# Patient Record
(demographics unavailable — no encounter records)

---

## 2025-02-20 NOTE — CARDIOLOGY SUMMARY
[No Ischemia] : no Ischemia [No Exercise Ind Arr] : no exercise induced arrhythmias [No Symptoms] : no Symptoms [___] : [unfilled] [LVEF ___%] : LVEF [unfilled]% [de-identified] : 2/20/25, sinus rhythm, nonspecific ST-T wave changes. 8/5/24, Sinus Bradycardia  -Left atrial enlargement. Voltage criteria for LVH (R(I)+S(III) exceeds 2.50 mV) -Voltage criteria w/o ST/T abnormality may be normal.  11/29/23, Sinus Rhythm  Voltage criteria for LVH (R(I)+S(III) exceeds 2.50 mV) -Voltage criteria w/o ST/T abnormality may be normal. -Poor R-wave progression -nonspecific -consider old anterior infarct. -Nonspecific T-abnormality. 5/11/23, Sinus  Rhythm, -Left atrial enlargement.  Voltage criteria for LVH  (R(I)+S(III) exceeds 2.50 mV)  -Voltage criteria w/o ST/T abnormality may be normal.  -Poor R-wave progression -nonspecific -consider old anterior infarct.  3/7/23,Sinus  Rhythm , -Left atrial enlargement.  Voltage criteria for LVH  (R(I)+S(III) exceeds 2.50 mV)  -Voltage criteria w/o ST/T abnormality may be normal.  -  Nonspecific T-abnormality.  9/6/22, Sinus  Bradycardia , Voltage criteria for LVH  (R(I)+S(III) exceeds 2.50 mV)  -Voltage criteria w/o ST/T abnormality may be normal.  -Poor R-wave progression -nonspecific -consider old anterior infarct.  -  Nonspecific T-abnormality.  [de-identified] : 7/1/21, EF 63%, moderate TR, trace MR,PA.

## 2025-02-20 NOTE — HISTORY OF PRESENT ILLNESS
[FreeTextEntry1] : 76-year-old AA female with history of hypertension, mild obesity.   Denies any chest pains, PND, palpitations, syncope, lightheadedness.  + dependent edema (but not today)  No other interval changes are noted in her medical regimen. +Glaucoma, on drops. No recent labs.  Still having left knee pain, s/p drainage of right knee; following with orthopedist

## 2025-02-20 NOTE — PHYSICAL EXAM
[Well Developed] : well developed [Well Nourished] : well nourished [No Acute Distress] : no acute distress [Normal Conjunctiva] : normal conjunctiva [Normal Venous Pressure] : normal venous pressure [No Carotid Bruit] : no carotid bruit [Normal S1, S2] : normal S1, S2 [No Rub] : no rub [No Gallop] : no gallop [Clear Lung Fields] : clear lung fields [Good Air Entry] : good air entry [No Respiratory Distress] : no respiratory distress  [Soft] : abdomen soft [Non Tender] : non-tender [No Masses/organomegaly] : no masses/organomegaly [Normal Bowel Sounds] : normal bowel sounds [Normal Gait] : normal gait [No Edema] : no edema [No Cyanosis] : no cyanosis [No Clubbing] : no clubbing [No Varicosities] : no varicosities [No Rash] : no rash [No Skin Lesions] : no skin lesions [Moves all extremities] : moves all extremities [No Focal Deficits] : no focal deficits [Normal Speech] : normal speech [Alert and Oriented] : alert and oriented [Normal memory] : normal memory [de-identified] : 2/6 systolic ejection murmur heard best at right upper sternal border

## 2025-02-20 NOTE — DISCUSSION/SUMMARY
[Non-specific ECG Changes] : abnormal ECG [Stress Test Treadmill] : an exercise treadmill test [Isotope Perfusion Sestamibi] : cardiac perfusion imaging with sestamibi [Essential Hypertension] : essential hypertension [Not Responding to Treatment] : not responding to treatment [Stable] : stable [Echocardiogram] : an echocardiogram [None] : none [Patient] : the patient [___ Month(s)] : in [unfilled] month(s) [EKG obtained to assist in diagnosis and management of assessed problem(s)] : EKG obtained to assist in diagnosis and management of assessed problem(s) [de-identified] : Re-evaluate next visit [FreeTextEntry1] : Recently diagnosed with Parkinsons, tremors improved. On Rasagiline - is this contributing to joint pain, inflammation? Told to discuss with Neurology. No recent labs, ordered for today Declined compression socks. PRN Lasix Rx given, instructed to use in case of emergency. Transthoracic echocardiogram requested for history of tricuspid insufficiency.

## 2025-02-20 NOTE — REASON FOR VISIT
[Follow-Up - Clinic] : a clinic follow-up of [Abnormal ECG] : an abnormal ECG [Dyspnea] : dyspnea [Hypertension] : hypertension

## 2025-03-25 NOTE — PHYSICAL EXAM
[Person] : oriented to person [Place] : oriented to place [Time] : oriented to time [Fluency] : fluency intact [Comprehension] : comprehension intact [Cranial Nerves Optic (II)] : visual acuity intact bilaterally,  visual fields full to confrontation, pupils equal round and reactive to light [Cranial Nerves Oculomotor (III)] : extraocular motion intact [Cranial Nerves Trigeminal (V)] : facial sensation intact symmetrically [Cranial Nerves Facial (VII)] : face symmetrical [Cranial Nerves Vestibulocochlear (VIII)] : hearing was intact bilaterally [Cranial Nerves Glossopharyngeal (IX)] : tongue and palate midline [Motor Handedness Right-Handed] : the patient is right hand dominant [Dysdiadochokinesia Bilaterally] : not present [Coordination - Dysmetria Impaired Finger-to-Nose Bilateral] : not present [Coordination - Dysmetria Impaired Heel-to-Shin Bilateral] : not present [FreeTextEntry1] : Last Dose : 3 hours ago Facial expression and voice are normal.  Extraocular movements were intact with normal saccades, smooth pursuit, and no square jerks seen.    Tone was slightly increased only with activation.  Shoulder shrug was symmetric.  Rapid alternating movements, handgrip, finger taps, foot tap, and toe tap all were normal bilaterally.  She was able to get up from the chair without using her arms.  Gait antalgic due tyo knee pain, recovers easily on pull test

## 2025-03-25 NOTE — HISTORY OF PRESENT ILLNESS
[FreeTextEntry1] : The patient is a 77-year-old right-handed woman who comes for follow up for PD, symptoms started in 2022.  1. Since last visit, overall doing well. More bothered by knee pain. Received cortisone shot, didn't help much. Walking and balance is impaired due to pain, no fall. Uses cane sometimes. 2. Taking Sinemet 25/100 1 tab TID 7am-12pm- 7pm. It has been helping with her tremors.  Tolerating it well. 3. Sleeps interrupted due to nocturia. NP RBD symptoms. 4.No constipation, no dysphagia.

## 2025-03-25 NOTE — DISCUSSION/SUMMARY
[FreeTextEntry1] : In summary, the patient is 77-year-old right-handed woman with PD , predominantly tremor starting in 2022. Responding very well to L-dopa. Couldn't tolerate Rasagiline.  Plan: - Continue Sinemet 25/100 TID - Follow up in 6 months  We discussed the above impression, plan and recommendations during the visit. Counseling represented more then 50% of the 30 minute visit time.   Patient is seen and discussed with Dr. Dyana GONZALEZ Movement Disorder Fellow

## 2025-03-25 NOTE — END OF VISIT
[Fellow] : Fellow [Time Spent: ___ minutes] : I have spent [unfilled] minutes of time on the encounter which excludes teaching and separately reported services.